# Patient Record
Sex: FEMALE | Race: BLACK OR AFRICAN AMERICAN | NOT HISPANIC OR LATINO | URBAN - METROPOLITAN AREA
[De-identification: names, ages, dates, MRNs, and addresses within clinical notes are randomized per-mention and may not be internally consistent; named-entity substitution may affect disease eponyms.]

---

## 2023-03-13 ENCOUNTER — OFFICE (OUTPATIENT)
Dept: URBAN - METROPOLITAN AREA CLINIC 72 | Facility: CLINIC | Age: 58
End: 2023-03-13

## 2023-03-13 VITALS
WEIGHT: 209 LBS | DIASTOLIC BLOOD PRESSURE: 84 MMHG | HEIGHT: 65 IN | HEART RATE: 78 BPM | OXYGEN SATURATION: 95 % | SYSTOLIC BLOOD PRESSURE: 124 MMHG

## 2023-03-13 DIAGNOSIS — R13.19 OTHER DYSPHAGIA: ICD-10-CM

## 2023-03-13 DIAGNOSIS — R05.3 CHRONIC COUGH: ICD-10-CM

## 2023-03-13 PROCEDURE — 99204 OFFICE O/P NEW MOD 45 MIN: CPT | Performed by: INTERNAL MEDICINE

## 2023-03-13 RX ORDER — SODIUM SULFATE, POTASSIUM SULFATE, MAGNESIUM SULFATE 17.5; 3.13; 1.6 G/ML; G/ML; G/ML
SOLUTION, CONCENTRATE ORAL
Qty: 1 | Refills: 0 | Status: ACTIVE
Start: 2023-03-13

## 2023-03-13 NOTE — SERVICENOTES
Our goal is to partner with you to improve your health and well being. It is important for you to complete necessary testing and follow the instructions given to you at your clinic visit. Our office will call you within 2 weeks with results of any testing but you may also call sooner to obtain results - [phone].   If you have any questions or concerns please feel free to call us.  We take your care very seriously and we thank you for your trust!
-- schedule EGD and colonoscopy, if you have any issues with the prep (ie high cost, not covered) at the pharmacy please let us know
- 
, Follow up as needed if symptoms are not improving or you have new or concerning symptoms.

## 2023-03-13 NOTE — SERVICEHPINOTES
Zenia Escamilla   is seen for an initial visit today.  
br
br   57 yeaer old referred by Dr. Tamayo for evaluation of dysphagia to liquids and solids and screening.  She had covid in october and developed a chornic cough after that.  gorge cough has improved.  When she does cough she has a pain in th emid portion of her back which she only noted since covid.  It is only 1-2 times a month.  br
br  Symptoms are worse with fried rice, it feels like it gets caught and eventually goes down.  She also notices it to certain seasonings.   NO dysphagia with liquids. She gets rare heartburn. No unintended weight loss.  No black stool or blood in the stool. br
br She notes sometimes when she sleeps she wakes up coughing like saliva going down thte wrong way. 
br
br She had a cologuard in 2021 that was negative.  NO family history of colon cancer.  My nurse has reviewed and updated the medication list with the patient. I have reviewed the medication list along with the documented medical, social and family history. Pertinent details are also noted above in the HPI.

## 2023-04-05 ENCOUNTER — AMBULATORY SURGICAL CENTER (OUTPATIENT)
Dept: URBAN - METROPOLITAN AREA SURGERY 19 | Facility: SURGERY | Age: 58
End: 2023-04-05
Payer: COMMERCIAL

## 2023-04-05 ENCOUNTER — OFFICE (OUTPATIENT)
Dept: URBAN - METROPOLITAN AREA PATHOLOGY 10 | Facility: PATHOLOGY | Age: 58
End: 2023-04-05
Payer: COMMERCIAL

## 2023-04-05 DIAGNOSIS — R13.10 DYSPHAGIA, UNSPECIFIED: ICD-10-CM

## 2023-04-05 DIAGNOSIS — Z12.11 ENCOUNTER FOR SCREENING FOR MALIGNANT NEOPLASM OF COLON: ICD-10-CM

## 2023-04-05 DIAGNOSIS — K63.89 OTHER SPECIFIED DISEASES OF INTESTINE: ICD-10-CM

## 2023-04-05 LAB
COLONOSCOPY STUDY: (no result)
COLONOSCOPY STUDY: (no result)
RELEVANT H&P ENDOSCOPY: (no result)
RELEVANT H&P ENDOSCOPY: (no result)

## 2023-04-05 PROCEDURE — 43239 EGD BIOPSY SINGLE/MULTIPLE: CPT | Mod: 51 | Performed by: INTERNAL MEDICINE

## 2023-04-05 PROCEDURE — 88305 TISSUE EXAM BY PATHOLOGIST: CPT | Performed by: PATHOLOGY

## 2023-04-05 PROCEDURE — G0121 COLON CA SCRN NOT HI RSK IND: HCPCS | Performed by: INTERNAL MEDICINE
